# Patient Record
Sex: FEMALE | Race: WHITE | NOT HISPANIC OR LATINO | URBAN - METROPOLITAN AREA
[De-identification: names, ages, dates, MRNs, and addresses within clinical notes are randomized per-mention and may not be internally consistent; named-entity substitution may affect disease eponyms.]

---

## 2024-10-11 ENCOUNTER — NURSING HOME VISIT (OUTPATIENT)
Dept: POST ACUTE CARE | Facility: EXTERNAL LOCATION | Age: 89
End: 2024-10-11

## 2024-10-11 DIAGNOSIS — F02.80 ALZHEIMER'S DISEASE: ICD-10-CM

## 2024-10-11 DIAGNOSIS — I10 ESSENTIAL (PRIMARY) HYPERTENSION: Primary | ICD-10-CM

## 2024-10-11 DIAGNOSIS — F32.A DEPRESSION, UNSPECIFIED DEPRESSION TYPE: ICD-10-CM

## 2024-10-11 DIAGNOSIS — G30.9 ALZHEIMER'S DISEASE: ICD-10-CM

## 2024-10-11 DIAGNOSIS — E03.9 HYPOTHYROIDISM, UNSPECIFIED TYPE: ICD-10-CM

## 2024-10-11 DIAGNOSIS — R62.7 FAILURE TO THRIVE IN ADULT: ICD-10-CM

## 2024-10-11 NOTE — LETTER
Patient: Beverly Perez  : 1930    Encounter Date: 10/11/2024    HISTORY & PHYSICAL    Subjective  Chief complaint: Beverly Perez is a 89 y.o. female who is being seen and evaluated for multiple medical problems.  Patient presents for admission to nursing facility.    HPI:  HPI  Patient is a 89-year-old female presenting for admission to nursing facility.  Patient is admitted to facility under hospice benefit.  Patient has a past history significant for dementia, hypertension, hyperlipidemia, hypothyroidism, asthma, malignant neoplasm of colon anxiety, CKD, OA, vitamin D deficiency, systolic heart failure.  Patient is seen and examined at the bedside, appears to be in no acute distress.  Patient appears comfortable.  Mentation at baseline.  Past Medical History:   Diagnosis Date   • Alzheimer's disease    • Anxiety    • CKD (chronic kidney disease)    • Cystoid macular degeneration, unspecified eye 2014    CME (cystoid macular edema)   • Essential (primary) hypertension 2022    Hypertension   • Exudative age-related macular degeneration, bilateral, stage unspecified     Age-related macular degeneration, wet, both eyes   • Exudative age-related macular degeneration, unspecified eye, stage unspecified (Multi) 2014    AMD (age-related macular degeneration), wet   • Hyperlipidemia    • Hypothyroidism    • Malignant neoplasm of colon, unspecified 2019    Colon cancer   • Osteoarthritis    • Serous detachment of retinal pigment epithelium, unspecified eye 2014    RPE (retinal pigment epithelium) detachment   • Unspecified dementia, unspecified severity, without behavioral disturbance, psychotic disturbance, mood disturbance, and anxiety 2021    Dementia   • Unspecified macular degeneration 2021    ARMD (age-related macular degeneration), bilateral   • Unspecified osteoarthritis, unspecified site 2014    Arthritis   • Vitamin D deficiency, unspecified 2022     Mild vitamin D deficiency   • Vitreous degeneration, bilateral 11/04/2014    PVD (posterior vitreous detachment), both eyes       Past Surgical History:   Procedure Laterality Date   • COLON SURGERY  05/30/2014    Colon Surgery   • OTHER SURGICAL HISTORY  10/30/2014    Intravitreal Injection Of A Pharmacologic Agent Left Eye   • OTHER SURGICAL HISTORY  10/09/2019    Hip surgery       Family History   Problem Relation Name Age of Onset   • No Known Problems Mother     • No Known Problems Father         Social History     Socioeconomic History   • Marital status:        Vital signs: 128/72, 97.1, 70, 18, 95%    Objective  Physical Exam  Constitutional:       General: She is not in acute distress.  Eyes:      Extraocular Movements: Extraocular movements intact.   Cardiovascular:      Rate and Rhythm: Normal rate and regular rhythm.   Pulmonary:      Effort: Pulmonary effort is normal.      Breath sounds: Normal breath sounds.   Abdominal:      General: Bowel sounds are normal.      Palpations: Abdomen is soft.   Musculoskeletal:      Cervical back: Neck supple.      Right lower leg: No edema.      Left lower leg: No edema.   Skin:     Comments: Skin intact   Neurological:      Mental Status: She is alert.      Comments: A&O: 0-1   Psychiatric:         Mood and Affect: Mood normal.         Behavior: Behavior is cooperative.         Assessment/Plan  Problem List Items Addressed This Visit       Essential (primary) hypertension - Primary     Monitor blood pressure  Metoprolol  Amlodipine         Alzheimer's disease     Assist with ADLs and care.  Monitor for safety         Hypothyroidism     Continue levothyroxine         Failure to thrive in adult     Hospice for comfort care measures.         Depression     Monitor mood and behaviors.  Continue antidepressants          Hospital records reviewed  Medications, treatments, and labs reviewed  Continue medications and treatments as listed in EMR  Discussed with  nursing and therapy      Scribe Attestation  I, Giovanni Rosado   attest that this documentation has been prepared under the direction and in the presence of Gautam Watkins MD    Provider Attestation - Scribe documentation  All medical record entries made by the Scribe were at my direction and personally dictated by me. I have reviewed the chart and agree that the record accurately reflects my personal performance of the history, physical exam, discussion and plan.   Gautam Watkins MD      Electronically Signed By: Gautam Watkins MD   10/12/24  9:36 AM

## 2024-10-11 NOTE — PROGRESS NOTES
HISTORY & PHYSICAL    Subjective   Chief complaint: Beverly Perez is a 89 y.o. female who is being seen and evaluated for multiple medical problems.  Patient presents for admission to nursing facility.    HPI:  HPI  Patient is a 89-year-old female presenting for admission to nursing facility.  Patient is admitted to facility under hospice benefit.  Patient has a past history significant for dementia, hypertension, hyperlipidemia, hypothyroidism, asthma, malignant neoplasm of colon anxiety, CKD, OA, vitamin D deficiency, systolic heart failure.  Patient is seen and examined at the bedside, appears to be in no acute distress.  Patient appears comfortable.  Mentation at baseline.  Past Medical History:   Diagnosis Date    Alzheimer's disease     Anxiety     CKD (chronic kidney disease)     Cystoid macular degeneration, unspecified eye 11/04/2014    CME (cystoid macular edema)    Essential (primary) hypertension 02/08/2022    Hypertension    Exudative age-related macular degeneration, bilateral, stage unspecified     Age-related macular degeneration, wet, both eyes    Exudative age-related macular degeneration, unspecified eye, stage unspecified (Multi) 05/30/2014    AMD (age-related macular degeneration), wet    Hyperlipidemia     Hypothyroidism     Malignant neoplasm of colon, unspecified 04/03/2019    Colon cancer    Osteoarthritis     Serous detachment of retinal pigment epithelium, unspecified eye 05/30/2014    RPE (retinal pigment epithelium) detachment    Unspecified dementia, unspecified severity, without behavioral disturbance, psychotic disturbance, mood disturbance, and anxiety 11/11/2021    Dementia    Unspecified macular degeneration 11/11/2021    ARMD (age-related macular degeneration), bilateral    Unspecified osteoarthritis, unspecified site 05/30/2014    Arthritis    Vitamin D deficiency, unspecified 02/08/2022    Mild vitamin D deficiency    Vitreous degeneration, bilateral 11/04/2014    PVD  (posterior vitreous detachment), both eyes       Past Surgical History:   Procedure Laterality Date    COLON SURGERY  05/30/2014    Colon Surgery    OTHER SURGICAL HISTORY  10/30/2014    Intravitreal Injection Of A Pharmacologic Agent Left Eye    OTHER SURGICAL HISTORY  10/09/2019    Hip surgery       Family History   Problem Relation Name Age of Onset    No Known Problems Mother      No Known Problems Father         Social History     Socioeconomic History    Marital status:        Vital signs: 128/72, 97.1, 70, 18, 95%    Objective   Physical Exam  Constitutional:       General: She is not in acute distress.  Eyes:      Extraocular Movements: Extraocular movements intact.   Cardiovascular:      Rate and Rhythm: Normal rate and regular rhythm.   Pulmonary:      Effort: Pulmonary effort is normal.      Breath sounds: Normal breath sounds.   Abdominal:      General: Bowel sounds are normal.      Palpations: Abdomen is soft.   Musculoskeletal:      Cervical back: Neck supple.      Right lower leg: No edema.      Left lower leg: No edema.   Skin:     Comments: Skin intact   Neurological:      Mental Status: She is alert.      Comments: A&O: 0-1   Psychiatric:         Mood and Affect: Mood normal.         Behavior: Behavior is cooperative.         Assessment/Plan   Problem List Items Addressed This Visit       Essential (primary) hypertension - Primary     Monitor blood pressure  Metoprolol  Amlodipine         Alzheimer's disease     Assist with ADLs and care.  Monitor for safety         Hypothyroidism     Continue levothyroxine         Failure to thrive in adult     Hospice for comfort care measures.         Depression     Monitor mood and behaviors.  Continue antidepressants          Hospital records reviewed  Medications, treatments, and labs reviewed  Continue medications and treatments as listed in EMR  Discussed with nursing and therapy      Scribe Attestation  I, Giovanni Rosado   attest that this  documentation has been prepared under the direction and in the presence of Gautam Watkins MD    Provider Attestation - Scribe documentation  All medical record entries made by the Scribe were at my direction and personally dictated by me. I have reviewed the chart and agree that the record accurately reflects my personal performance of the history, physical exam, discussion and plan.   Gautam Watkins MD

## 2024-11-01 ENCOUNTER — NURSING HOME VISIT (OUTPATIENT)
Dept: POST ACUTE CARE | Facility: EXTERNAL LOCATION | Age: 89
End: 2024-11-01
Payer: COMMERCIAL

## 2024-11-01 DIAGNOSIS — G30.9 ALZHEIMER'S DISEASE: Primary | ICD-10-CM

## 2024-11-01 DIAGNOSIS — R62.7 FAILURE TO THRIVE IN ADULT: ICD-10-CM

## 2024-11-01 DIAGNOSIS — E03.9 HYPOTHYROIDISM, UNSPECIFIED TYPE: ICD-10-CM

## 2024-11-01 DIAGNOSIS — F02.80 ALZHEIMER'S DISEASE: Primary | ICD-10-CM

## 2024-11-01 DIAGNOSIS — F32.A DEPRESSION, UNSPECIFIED DEPRESSION TYPE: ICD-10-CM

## 2024-11-01 DIAGNOSIS — I10 ESSENTIAL (PRIMARY) HYPERTENSION: ICD-10-CM

## 2024-11-27 ENCOUNTER — NURSING HOME VISIT (OUTPATIENT)
Dept: POST ACUTE CARE | Facility: EXTERNAL LOCATION | Age: 89
End: 2024-11-27
Payer: COMMERCIAL

## 2024-11-27 DIAGNOSIS — F41.9 ANXIETY: ICD-10-CM

## 2024-11-27 DIAGNOSIS — G30.9 ALZHEIMER'S DISEASE: ICD-10-CM

## 2024-11-27 DIAGNOSIS — I10 ESSENTIAL (PRIMARY) HYPERTENSION: ICD-10-CM

## 2024-11-27 DIAGNOSIS — F02.80 ALZHEIMER'S DISEASE: ICD-10-CM

## 2024-11-27 DIAGNOSIS — R62.7 FAILURE TO THRIVE IN ADULT: Primary | ICD-10-CM

## 2024-11-27 PROCEDURE — 99308 SBSQ NF CARE LOW MDM 20: CPT | Performed by: INTERNAL MEDICINE

## 2024-11-27 NOTE — LETTER
Patient: Beverly Perez  : 1930    Encounter Date: 2024    PROGRESS NOTE    Subjective  Chief complaint: Beverly Perez is a 89 y.o. female who is a long term care patient being seen and evaluated for anxiety.    HPI:  HPI  Asked to see patient for use of lorazepam as needed.  Patient is to continue for 3 months and reevaluate further need.  Patient is on hospice for comfort care measures.    Objective  Vital signs: 124/66, 97.3, 83, 18, 98%    Physical Exam  Constitutional:       General: She is not in acute distress.  Eyes:      Extraocular Movements: Extraocular movements intact.   Cardiovascular:      Rate and Rhythm: Normal rate and regular rhythm.   Pulmonary:      Effort: Pulmonary effort is normal.      Breath sounds: Normal breath sounds.   Abdominal:      General: Bowel sounds are normal.      Palpations: Abdomen is soft.   Musculoskeletal:      Cervical back: Neck supple.      Right lower leg: No edema.      Left lower leg: No edema.   Skin:     Comments: Skin intact   Neurological:      Mental Status: She is alert.      Comments: A&O: 0-1   Psychiatric:         Mood and Affect: Mood normal.         Behavior: Behavior is cooperative.         Assessment/Plan  Problem List Items Addressed This Visit       Essential (primary) hypertension     Monitor blood pressure  Metoprolol  Amlodipine         Alzheimer's disease     Assist with ADLs and care.  Monitor for safety  Hospice         Failure to thrive in adult - Primary     Hospice for comfort care measures.         Anxiety     Continue lorazepam x 3 months and reevaluate          Medications, treatments, and labs reviewed  Continue medications and treatments as listed in EMR    Scribe Attestation  FELI, Giovanni Rosado   attest that this documentation has been prepared under the direction and in the presence of Gautam Watkins MD    Provider Attestation - Scribe documentation  All medical record entries made by the Scribe were at my  direction and personally dictated by me. I have reviewed the chart and agree that the record accurately reflects my personal performance of the history, physical exam, discussion and plan.   Gautam Watkins MD            Electronically Signed By: Gautam Watkins MD   12/1/24  4:22 PM

## 2024-11-27 NOTE — PROGRESS NOTES
PROGRESS NOTE    Subjective   Chief complaint: Beverly Perez is a 89 y.o. female who is a long term care patient being seen and evaluated for anxiety.    HPI:  HPI  Asked to see patient for use of lorazepam as needed.  Patient is to continue for 3 months and reevaluate further need.  Patient is on hospice for comfort care measures.    Objective   Vital signs: 124/66, 97.3, 83, 18, 98%    Physical Exam  Constitutional:       General: She is not in acute distress.  Eyes:      Extraocular Movements: Extraocular movements intact.   Cardiovascular:      Rate and Rhythm: Normal rate and regular rhythm.   Pulmonary:      Effort: Pulmonary effort is normal.      Breath sounds: Normal breath sounds.   Abdominal:      General: Bowel sounds are normal.      Palpations: Abdomen is soft.   Musculoskeletal:      Cervical back: Neck supple.      Right lower leg: No edema.      Left lower leg: No edema.   Skin:     Comments: Skin intact   Neurological:      Mental Status: She is alert.      Comments: A&O: 0-1   Psychiatric:         Mood and Affect: Mood normal.         Behavior: Behavior is cooperative.         Assessment/Plan   Problem List Items Addressed This Visit       Essential (primary) hypertension     Monitor blood pressure  Metoprolol  Amlodipine         Alzheimer's disease     Assist with ADLs and care.  Monitor for safety  Hospice         Failure to thrive in adult - Primary     Hospice for comfort care measures.         Anxiety     Continue lorazepam x 3 months and reevaluate          Medications, treatments, and labs reviewed  Continue medications and treatments as listed in EMR    Scribe Attestation  Liliana EDMOND Scribe   attest that this documentation has been prepared under the direction and in the presence of Gauatm Watkins MD    Provider Attestation - Scribe documentation  All medical record entries made by the Scribe were at my direction and personally dictated by me. I have reviewed the chart and agree  that the record accurately reflects my personal performance of the history, physical exam, discussion and plan.   Gautam Watkins MD

## 2024-12-06 ENCOUNTER — NURSING HOME VISIT (OUTPATIENT)
Dept: POST ACUTE CARE | Facility: EXTERNAL LOCATION | Age: 89
End: 2024-12-06
Payer: COMMERCIAL

## 2024-12-06 DIAGNOSIS — F02.80 ALZHEIMER'S DISEASE: Primary | ICD-10-CM

## 2024-12-06 DIAGNOSIS — G30.9 ALZHEIMER'S DISEASE: Primary | ICD-10-CM

## 2024-12-06 DIAGNOSIS — E03.9 HYPOTHYROIDISM, UNSPECIFIED TYPE: ICD-10-CM

## 2024-12-06 DIAGNOSIS — I10 ESSENTIAL (PRIMARY) HYPERTENSION: ICD-10-CM

## 2024-12-06 DIAGNOSIS — R62.7 FAILURE TO THRIVE IN ADULT: ICD-10-CM

## 2024-12-06 DIAGNOSIS — F32.A DEPRESSION, UNSPECIFIED DEPRESSION TYPE: ICD-10-CM

## 2024-12-06 NOTE — PROGRESS NOTES
PROGRESS NOTE    Subjective   Chief complaint: Beverly Perez is a 89 y.o. female who is a long term care patient being seen and evaluated for monthly general medical care and follow-up    HPI:  HPI  Patient presents for general medical care and f/u.  Patient seen and examined at bedside.  No issues per nursing.  Patient has no acute complaints.  Patient is on hospice for comfort care measures.  Patient has dementia and requires assist with ADLs.  HTN BP at goal.  Denies chest pain and headache.  Patient with diagnosis of depression.  Mood is stable.  Denies feeling down and thoughts of harming self or others.  hypothyroidism controlled, denies fatigue, heat/cold intolerance, weight changes.  Mentation at baseline, no acute distress.  Appears comfortable    Objective   Vital signs: 116/66, 98.2, 84, 20 to 98%    Physical Exam  Constitutional:       General: She is not in acute distress.  Eyes:      Extraocular Movements: Extraocular movements intact.   Cardiovascular:      Rate and Rhythm: Normal rate and regular rhythm.   Pulmonary:      Effort: Pulmonary effort is normal.      Breath sounds: Normal breath sounds.   Abdominal:      General: Bowel sounds are normal.      Palpations: Abdomen is soft.   Musculoskeletal:      Cervical back: Neck supple.      Right lower leg: No edema.      Left lower leg: No edema.   Skin:     Comments: Skin intact   Neurological:      Mental Status: She is alert.      Comments: A&O: 0-1   Psychiatric:         Mood and Affect: Mood normal.         Behavior: Behavior is cooperative.         Assessment/Plan   Problem List Items Addressed This Visit       Essential (primary) hypertension     Monitor blood pressure  Metoprolol  Amlodipine         Alzheimer's disease - Primary     Assist with ADLs and care.  Monitor for safety  Hospice         Hypothyroidism     Continue levothyroxine as ordered         Failure to thrive in adult     Hospice for comfort care measures.         Depression      Monitor mood and behaviors.  Continue antidepressants          Medications, treatments, and labs reviewed  Continue medications and treatments as listed in EMR    Scribe Attestation  I, Giovanni Rosado   attest that this documentation has been prepared under the direction and in the presence of Gautam Watkins MD    Provider Attestation - Scribe documentation  All medical record entries made by the Scribe were at my direction and personally dictated by me. I have reviewed the chart and agree that the record accurately reflects my personal performance of the history, physical exam, discussion and plan.   Gautam Watkins MD

## 2024-12-06 NOTE — LETTER
Patient: Beverly Perez  : 1930    Encounter Date: 2024    PROGRESS NOTE    Subjective  Chief complaint: Beverly Perez is a 89 y.o. female who is a long term care patient being seen and evaluated for monthly general medical care and follow-up    HPI:  HPI  Patient presents for general medical care and f/u.  Patient seen and examined at bedside.  No issues per nursing.  Patient has no acute complaints.  Patient is on hospice for comfort care measures.  Patient has dementia and requires assist with ADLs.  HTN BP at goal.  Denies chest pain and headache.  Patient with diagnosis of depression.  Mood is stable.  Denies feeling down and thoughts of harming self or others.  hypothyroidism controlled, denies fatigue, heat/cold intolerance, weight changes.  Mentation at baseline, no acute distress.  Appears comfortable    Objective  Vital signs: 116/66, 98.2, 84, 20 to 98%    Physical Exam  Constitutional:       General: She is not in acute distress.  Eyes:      Extraocular Movements: Extraocular movements intact.   Cardiovascular:      Rate and Rhythm: Normal rate and regular rhythm.   Pulmonary:      Effort: Pulmonary effort is normal.      Breath sounds: Normal breath sounds.   Abdominal:      General: Bowel sounds are normal.      Palpations: Abdomen is soft.   Musculoskeletal:      Cervical back: Neck supple.      Right lower leg: No edema.      Left lower leg: No edema.   Skin:     Comments: Skin intact   Neurological:      Mental Status: She is alert.      Comments: A&O: 0-1   Psychiatric:         Mood and Affect: Mood normal.         Behavior: Behavior is cooperative.         Assessment/Plan  Problem List Items Addressed This Visit       Essential (primary) hypertension     Monitor blood pressure  Metoprolol  Amlodipine         Alzheimer's disease - Primary     Assist with ADLs and care.  Monitor for safety  Hospice         Hypothyroidism     Continue levothyroxine as ordered         Failure to thrive  in adult     Hospice for comfort care measures.         Depression     Monitor mood and behaviors.  Continue antidepressants          Medications, treatments, and labs reviewed  Continue medications and treatments as listed in EMR    Scribe Attestation  I, Giovanni Rosado   attest that this documentation has been prepared under the direction and in the presence of Gautam Watkins MD    Provider Attestation - Scribe documentation  All medical record entries made by the Scribe were at my direction and personally dictated by me. I have reviewed the chart and agree that the record accurately reflects my personal performance of the history, physical exam, discussion and plan.   Gautam Watkins MD            Electronically Signed By: Gautam Watkins MD   12/7/24 11:09 AM